# Patient Record
Sex: MALE | Race: BLACK OR AFRICAN AMERICAN | URBAN - METROPOLITAN AREA
[De-identification: names, ages, dates, MRNs, and addresses within clinical notes are randomized per-mention and may not be internally consistent; named-entity substitution may affect disease eponyms.]

---

## 2023-05-02 ENCOUNTER — WEB ENCOUNTER (OUTPATIENT)
Dept: URBAN - METROPOLITAN AREA CLINIC 128 | Facility: CLINIC | Age: 68
End: 2023-05-02

## 2023-05-02 ENCOUNTER — OFFICE VISIT (OUTPATIENT)
Dept: URBAN - METROPOLITAN AREA CLINIC 128 | Facility: CLINIC | Age: 68
End: 2023-05-02
Payer: COMMERCIAL

## 2023-05-02 ENCOUNTER — DASHBOARD ENCOUNTERS (OUTPATIENT)
Age: 68
End: 2023-05-02

## 2023-05-02 VITALS
HEART RATE: 68 BPM | HEIGHT: 75 IN | TEMPERATURE: 97.8 F | DIASTOLIC BLOOD PRESSURE: 74 MMHG | SYSTOLIC BLOOD PRESSURE: 112 MMHG | BODY MASS INDEX: 21.86 KG/M2 | WEIGHT: 175.8 LBS

## 2023-05-02 DIAGNOSIS — Z86.010 PERSONAL HISTORY OF COLONIC POLYPS: ICD-10-CM

## 2023-05-02 DIAGNOSIS — R74.8 ELEVATED LIPASE: ICD-10-CM

## 2023-05-02 DIAGNOSIS — Z85.038 PERSONAL HISTORY OF COLON CANCER: ICD-10-CM

## 2023-05-02 PROBLEM — 429699009: Status: ACTIVE | Noted: 2023-05-02

## 2023-05-02 PROBLEM — 428283002: Status: ACTIVE | Noted: 2023-05-02

## 2023-05-02 PROCEDURE — 99214 OFFICE O/P EST MOD 30 MIN: CPT | Performed by: PHYSICIAN ASSISTANT

## 2023-05-02 RX ORDER — PANTOPRAZOLE SODIUM 20 MG/1
1 TABLET TABLET, DELAYED RELEASE ORAL ONCE A DAY
Status: ON HOLD | COMMUNITY

## 2023-05-02 RX ORDER — POLYETHYLENE GLYCOL 3350, SODIUM SULFATE ANHYDROUS, SODIUM BICARBONATE, SODIUM CHLORIDE, POTASSIUM CHLORIDE 236; 22.74; 6.74; 5.86; 2.97 G/4L; G/4L; G/4L; G/4L; G/4L
AS DIRECTED POWDER, FOR SOLUTION ORAL
Qty: 1 BOTTLE | Refills: 0 | OUTPATIENT
Start: 2023-05-02 | End: 2023-05-03

## 2023-05-02 RX ORDER — FAMOTIDINE 20 MG/1
1 TABLET AT BEDTIME AS NEEDED TABLET, FILM COATED ORAL ONCE A DAY
Status: ON HOLD | COMMUNITY

## 2023-05-02 NOTE — PHYSICAL EXAM GASTROINTESTINAL
Abdomen , soft, nontender, nondistended , no guarding or rigidity , no masses palpable , normal bowel sounds, negative Mccann's sign, negative psoas and obturators signs, negative CVA tenderness bilaterally, old surgical scars noted Liver and Spleen , no hepatosplenomegaly Rectal deferred

## 2023-05-02 NOTE — HPI-TODAY'S VISIT:
The patient went to [facility] ED on 4/28/23 for chest pain. His chest pain has now ceased. He followed up with cardiologist last week. He has a history of coronary artery disease with previous myocardial infarction in 2021 and stent deployment, hiatal hernia, colon cancer, tobacco dependence. The patient was seen on 4/15/2023 at the ED with abdominal pain. CT of the chest with PE protocol showed widespread coronary calcifications. White blood cells were 6.9. Mama BMP noted. It was noted that his shortness of breath was most likely related to his lungs and not his heart. He had an echocardiogram done at the hospital. The patient was having abdominal pain, nausea, vomiting after driving back from a trip. He has been in remission with his colon cancer since 2013 where he had a portion of his colon resected and he had chemotherapy. He has not been reevaluated since then. He has infrequent ETOH use. He's having normal bowel movements. Lipase was 137. Liver function tests were normal. Abdominal and pelvic CT scan revealed no acute intra abdominal abnormalities, umbilical hernia containing small bowel loops without evidence of obstruction was noted. He had no signs of pancreatitis noted on CT. Last colonoscopy: 2017, revealed a hyperplastic polyp and he was advsied to repeat it in 5 years, He offers no symptoms currently. He offers no abdominal pain now. He believes he ate "something bad" when he went to the hospital but he feels fine now.